# Patient Record
Sex: MALE | Race: WHITE | Employment: FULL TIME | URBAN - METROPOLITAN AREA
[De-identification: names, ages, dates, MRNs, and addresses within clinical notes are randomized per-mention and may not be internally consistent; named-entity substitution may affect disease eponyms.]

---

## 2021-08-04 ENCOUNTER — APPOINTMENT (OUTPATIENT)
Dept: CT IMAGING | Facility: CLINIC | Age: 41
End: 2021-08-04
Payer: COMMERCIAL

## 2021-08-04 ENCOUNTER — HOSPITAL ENCOUNTER (EMERGENCY)
Facility: CLINIC | Age: 41
Discharge: HOME | End: 2021-08-04
Admitting: EMERGENCY MEDICINE
Payer: COMMERCIAL

## 2021-08-04 VITALS
HEART RATE: 72 BPM | DIASTOLIC BLOOD PRESSURE: 80 MMHG | RESPIRATION RATE: 18 BRPM | SYSTOLIC BLOOD PRESSURE: 174 MMHG | TEMPERATURE: 98.8 F | WEIGHT: 315 LBS | HEIGHT: 71 IN | OXYGEN SATURATION: 99 % | BODY MASS INDEX: 44.1 KG/M2

## 2021-08-04 DIAGNOSIS — K42.9 PERIUMBILICAL HERNIA: ICD-10-CM

## 2021-08-04 DIAGNOSIS — R03.0 ELEVATED BP WITHOUT DIAGNOSIS OF HYPERTENSION: ICD-10-CM

## 2021-08-04 LAB
ALBUMIN SERPL-MCNC: 4 G/DL (ref 3.5–5)
ALBUMIN UR-MCNC: NEGATIVE MG/DL
ALP SERPL-CCNC: 157 U/L (ref 45–120)
ALT SERPL W P-5'-P-CCNC: 35 U/L (ref 0–45)
ANION GAP SERPL CALCULATED.3IONS-SCNC: 10 MMOL/L (ref 5–18)
APPEARANCE UR: CLEAR
AST SERPL W P-5'-P-CCNC: 23 U/L (ref 0–40)
BACTERIA #/AREA URNS HPF: NORMAL /HPF
BILIRUB SERPL-MCNC: 0.7 MG/DL (ref 0–1)
BILIRUB UR QL STRIP: NEGATIVE
BUN SERPL-MCNC: 10 MG/DL (ref 8–22)
CALCIUM SERPL-MCNC: 9.7 MG/DL (ref 8.5–10.5)
CHLORIDE BLD-SCNC: 102 MMOL/L (ref 98–107)
CO2 SERPL-SCNC: 26 MMOL/L (ref 22–31)
COLOR UR AUTO: NORMAL
CREAT SERPL-MCNC: 0.86 MG/DL (ref 0.7–1.3)
ERYTHROCYTE [DISTWIDTH] IN BLOOD BY AUTOMATED COUNT: 12.8 % (ref 10–15)
GFR SERPL CREATININE-BSD FRML MDRD: >90 ML/MIN/1.73M2
GLUCOSE BLD-MCNC: 118 MG/DL (ref 70–125)
GLUCOSE UR STRIP-MCNC: NEGATIVE MG/DL
HCT VFR BLD AUTO: 48.9 % (ref 40–53)
HGB BLD-MCNC: 16.6 G/DL (ref 13.3–17.7)
HGB UR QL STRIP: NEGATIVE
KETONES UR STRIP-MCNC: NEGATIVE MG/DL
LEUKOCYTE ESTERASE UR QL STRIP: NEGATIVE
MCH RBC QN AUTO: 30.3 PG (ref 26.5–33)
MCHC RBC AUTO-ENTMCNC: 33.9 G/DL (ref 31.5–36.5)
MCV RBC AUTO: 89 FL (ref 78–100)
NITRATE UR QL: NEGATIVE
PH UR STRIP: 7 [PH] (ref 5–7)
PLATELET # BLD AUTO: 292 10E3/UL (ref 150–450)
POTASSIUM BLD-SCNC: 4.3 MMOL/L (ref 3.5–5)
PROT SERPL-MCNC: 8.5 G/DL (ref 6–8)
RBC # BLD AUTO: 5.48 10E6/UL (ref 4.4–5.9)
RBC URINE: 1 /HPF
SODIUM SERPL-SCNC: 138 MMOL/L (ref 136–145)
SP GR UR STRIP: 1.01 (ref 1–1.03)
UROBILINOGEN UR STRIP-MCNC: <2 MG/DL
WBC # BLD AUTO: 13.6 10E3/UL (ref 4–11)
WBC URINE: 1 /HPF

## 2021-08-04 PROCEDURE — 99285 EMERGENCY DEPT VISIT HI MDM: CPT | Mod: 25

## 2021-08-04 PROCEDURE — 74177 CT ABD & PELVIS W/CONTRAST: CPT

## 2021-08-04 PROCEDURE — 81001 URINALYSIS AUTO W/SCOPE: CPT | Performed by: EMERGENCY MEDICINE

## 2021-08-04 PROCEDURE — 80053 COMPREHEN METABOLIC PANEL: CPT | Performed by: EMERGENCY MEDICINE

## 2021-08-04 PROCEDURE — 250N000011 HC RX IP 250 OP 636: Performed by: EMERGENCY MEDICINE

## 2021-08-04 PROCEDURE — 36415 COLL VENOUS BLD VENIPUNCTURE: CPT | Performed by: EMERGENCY MEDICINE

## 2021-08-04 PROCEDURE — 85018 HEMOGLOBIN: CPT | Performed by: EMERGENCY MEDICINE

## 2021-08-04 RX ORDER — IOPAMIDOL 755 MG/ML
100 INJECTION, SOLUTION INTRAVASCULAR ONCE
Status: COMPLETED | OUTPATIENT
Start: 2021-08-04 | End: 2021-08-04

## 2021-08-04 RX ADMIN — IOPAMIDOL 100 ML: 755 INJECTION, SOLUTION INTRAVENOUS at 16:26

## 2021-08-04 ASSESSMENT — MIFFLIN-ST. JEOR: SCORE: 2345.96

## 2021-08-04 NOTE — ED PROVIDER NOTES
"ED SIGNOUT  Date/Time:8/4/2021 4:11 PM    Patient signed out to me by my colleague, FRANK Andrews.  Please see their note for complete history and physical. Plan to follow up on CT results    Repeat physical exam does reveal a soft periumbilical mass that is minimally tender.    The creation of this record is based on the scribe s observations of the work being performed by Harsh Aragon CNP and the provider s statements to them. It was created on their behalf by Burak Alonso a trained medical scribe. This document has been checked and approved by the attending provider.      REMAINING ED WORKUP:    Vitals:  BP (!) 185/82   Pulse 85   Temp 98.8  F (37.1  C) (Oral)   Resp 16   Ht 1.803 m (5' 11\")   Wt 142.9 kg (315 lb)   SpO2 98%   BMI 43.93 kg/m        Pertinent labs results reviewed   Results for orders placed or performed during the hospital encounter of 08/04/21   CT Abdomen Pelvis w Contrast    Impression    IMPRESSION:   1.  Large fat-containing paraumbilical hernia and small fat-containing hernia left lateral abdominal wall.  2.  Colonic diverticulosis.   UA with Microscopic reflex to Culture    Specimen: Urine, Clean Catch   Result Value Ref Range    Color Urine Light Yellow Colorless, Straw, Light Yellow, Yellow    Appearance Urine Clear Clear    Glucose Urine Negative Negative mg/dL    Bilirubin Urine Negative Negative    Ketones Urine Negative Negative mg/dL    Specific Gravity Urine 1.015 1.001 - 1.030    Blood Urine Negative Negative    pH Urine 7.0 5.0 - 7.0    Protein Albumin Urine Negative Negative mg/dL    Urobilinogen Urine <2.0 <2.0 mg/dL    Nitrite Urine Negative Negative    Leukocyte Esterase Urine Negative Negative    Bacteria Urine None Seen None Seen /HPF    RBC Urine 1 <=2 /HPF    WBC Urine 1 <=5 /HPF   CBC (+ platelets, no diff)   Result Value Ref Range    WBC Count 13.6 (H) 4.0 - 11.0 10e3/uL    RBC Count 5.48 4.40 - 5.90 10e6/uL    Hemoglobin 16.6 13.3 - 17.7 g/dL    " Hematocrit 48.9 40.0 - 53.0 %    MCV 89 78 - 100 fL    MCH 30.3 26.5 - 33.0 pg    MCHC 33.9 31.5 - 36.5 g/dL    RDW 12.8 10.0 - 15.0 %    Platelet Count 292 150 - 450 10e3/uL   Comprehensive metabolic panel   Result Value Ref Range    Sodium 138 136 - 145 mmol/L    Potassium 4.3 3.5 - 5.0 mmol/L    Chloride 102 98 - 107 mmol/L    Carbon Dioxide (CO2) 26 22 - 31 mmol/L    Anion Gap 10 5 - 18 mmol/L    Urea Nitrogen 10 8 - 22 mg/dL    Creatinine 0.86 0.70 - 1.30 mg/dL    Calcium 9.7 8.5 - 10.5 mg/dL    Glucose 118 70 - 125 mg/dL    Alkaline Phosphatase 157 (H) 45 - 120 U/L    AST 23 0 - 40 U/L    ALT 35 0 - 45 U/L    Protein Total 8.5 (H) 6.0 - 8.0 g/dL    Albumin 4.0 3.5 - 5.0 g/dL    Bilirubin Total 0.7 0.0 - 1.0 mg/dL    GFR Estimate >90 >60 mL/min/1.73m2       Pertinent imaging reviewed   Please see official radiology report.  CT Abdomen Pelvis w Contrast   Final Result   IMPRESSION:    1.  Large fat-containing paraumbilical hernia and small fat-containing hernia left lateral abdominal wall.   2.  Colonic diverticulosis.           Interventions  Medications   iopamidol (ISOVUE-370) solution 100 mL (100 mLs Intravenous Given 8/4/21 1626)        ED Course/MDM:  4:12 PM Signout accepted from PAC Darryl Schwarz.  Prior records were reviewed.  Diagnostics from this visit are reviewed.  4:52 PM I introduced myself to the patient. I updated the patient on their results. I discussed the plan for discharge with the patient, and patient is agreeable. We discussed supportive cares at home and reasons for return to the ER including new or worsening symptoms - all questions and concerns addressed. Patient to be discharged by RN.              1. Periumbilical hernia    2. Elevated BP without diagnosis of hypertension          Burak GARCIA, am serving as a scribe to document services personally performed by Harsh Aragon CNP, based on myobservation and the provider's statements to me. IHarsh CNP attest that  Burak Alonso is acting in a scribe capacity, has observed my performance of the services and has documented them in accordance with my direction.    [facility] Emergency Department          Harsh Aragon, APRN CNP  08/04/21 7358

## 2021-08-04 NOTE — ED TRIAGE NOTES
"The patient presents to the ED with c/o mid-abdominal pain that became \"hard\" and painful last night. Reports he has been seen for this before but was not having any pain. Last BM was today.  "

## 2021-08-04 NOTE — DISCHARGE INSTRUCTIONS
Your CT scan does show a hernia containing internal fat. It does not contain any bowel.    Call your surgery clinic to discuss follow up and possible repair.    Avoid heaving lifting and constipation.    Follow up in clinic in 1-2 weeks for recheck of your blood pressure    Return to the ER for new / worsening symptoms or other concerns.

## 2021-08-04 NOTE — ED PROVIDER NOTES
EMERGENCY DEPARTMENT ENCOUNTER      NAME: Kam Lyons  AGE: 41 year old male  YOB: 1980  MRN: [id number]  EVALUATION DATE & TIME: 8/4/2021  2:28 PM    PCP: No primary care provider on file.    ED PROVIDER: Karishma Andrews PA-C      Chief Complaint   Patient presents with     Abdominal Pain     hernia         FINAL IMPRESSION:  1. Periumbilical hernia          ED COURSE & MEDICAL DECISION MAKING:    Pertinent Labs & Imaging studies reviewed. (See chart for details)    41 year old male presents to the Emergency Department for evaluation of jadiel pain.    Physical exam is remarkable for a generally well-appearing male who is in no acute distress.  He does have a palpable umbilical hernia that is soft.  He endorses some tenderness to palpation.  No warmth or erythema noted.  Heart and lung sounds clear diffusely throughout.  Vital signs are stable and he is afebrile.    CBC is remarkable for leukocytosis with white blood cell count of 13.6, no anemia.  CMP is unremarkable with no significant electrolyte derangements, normal liver and kidney function.  Urinalysis without blood or infection.    Patient's care was signed out to Hossein Aragon CNP for follow-up on CT scan.  If the hernia is not strangulated or incarcerated, patient likely okay for discharge home.    ED Course   2:34 PM Performed my initial history and physical exam. Discussed workup in the emergency department, management of symptoms, and likely disposition. PPE: surgical mask.  4:14 PM Care signed out to Hossein Aragon CNP for follow up on CT.     At the conclusion of the encounter I discussed the results of all of the tests and the disposition. The questions were answered. The patient or family acknowledged understanding and was agreeable with the care plan.     Voice recognition software was used in the creation of this note. Any grammatical or nonsensical errors are due to inherent errors with the software and are not the intention of the  writer.     MEDICATIONS GIVEN IN THE EMERGENCY:  Medications   iopamidol (ISOVUE-370) solution 100 mL (has no administration in time range)       NEW PRESCRIPTIONS STARTED AT TODAY'S ER VISIT  New Prescriptions    No medications on file            =================================================================    HPI    Patient information was obtained from: Patient    Use of Intrepreter: N/A      Kam Lyons is a 41 year old male who history of umbilical hernia, HLD, prediabetes, morbid obesity, chronic GERD, presents with abdominal pain.    Patient reports he has a known umbilical hernia which became hard last night while getting ready for bed, noting associated pain to the area. No recent heavy lifting or exercise. He did not have pain with the hernia before last night. No nausea, vomiting, new diarrhea, black or bloody stools, fevers, dysuria, hematuria. He has been taking advil for pain. No prior abdominal surgeries.    Per chart review, patient was evaluated by Dr. Jose G Maradiaga with [facility] on 7/16/21 for umbilical hernia. Plan to proceed with observation and to schedule surgery once patient has reached weight loss goals.    REVIEW OF SYSTEMS   Constitutional:  No reported fever  GI:  No reported nausea, vomiting, dark or bloody stools, or new diarrhea. Positive for abdominal pain, hernia  : No reported dysuria, hematuria, difficulty urinating    All other systems reviewed and are negative unless noted in HPI.    PAST MEDICAL HISTORY:  Past Medical History:   Diagnosis Date     NO ACTIVE PROBLEMS        PAST SURGICAL HISTORY:  Past Surgical History:   Procedure Laterality Date     ARTHROSCOPIC RECONSTRUCTION ANTERIOR CRUCIATE LIGAMENT  11/2011    L knee ACL       CURRENT MEDICATIONS:    loratadine (CLARITIN) 10 MG tablet  NO ACTIVE MEDICATIONS        ALLERGIES:  No Known Allergies    FAMILY HISTORY:  Family History   Problem Relation Age of Onset     Diabetes Mother          "prediabetic       SOCIAL HISTORY:   Social History     Socioeconomic History     Marital status: Single     Spouse name: Not on file     Number of children: Not on file     Years of education: Not on file     Highest education level: Not on file   Occupational History     Not on file   Tobacco Use     Smoking status: Never Smoker     Smokeless tobacco: Never Used   Substance and Sexual Activity     Alcohol use: Yes     Alcohol/week: 4.2 standard drinks     Types: 5 drink(s) per week     Drug use: No     Sexual activity: Not on file   Other Topics Concern     Parent/sibling w/ CABG, MI or angioplasty before 65F 55M? Not Asked   Social History Narrative     Not on file     Social Determinants of Health     Financial Resource Strain:      Difficulty of Paying Living Expenses:    Food Insecurity:      Worried About Running Out of Food in the Last Year:      Ran Out of Food in the Last Year:    Transportation Needs:      Lack of Transportation (Medical):      Lack of Transportation (Non-Medical):    Physical Activity:      Days of Exercise per Week:      Minutes of Exercise per Session:    Stress:      Feeling of Stress :    Social Connections:      Frequency of Communication with Friends and Family:      Frequency of Social Gatherings with Friends and Family:      Attends Orthodoxy Services:      Active Member of Clubs or Organizations:      Attends Club or Organization Meetings:      Marital Status:    Intimate Partner Violence:      Fear of Current or Ex-Partner:      Emotionally Abused:      Physically Abused:      Sexually Abused:        VITALS:  Patient Vitals for the past 24 hrs:   BP Temp Temp src Pulse Resp SpO2 Height Weight   08/04/21 1226 (!) 185/82 98.8  F (37.1  C) Oral 85 16 98 % 1.803 m (5' 11\") 142.9 kg (315 lb)       PHYSICAL EXAM    VITAL SIGNS: BP (!) 185/82   Pulse 85   Temp 98.8  F (37.1  C) (Oral)   Resp 16   Ht 1.803 m (5' 11\")   Wt 142.9 kg (315 lb)   SpO2 98%   BMI 43.93 kg/m    General " Appearance: Alert, cooperative, normal speech and facial symmetry, appears stated age, the patient does not appear in distress  Head:  Normocephalic, without obvious abnormality, atraumatic  Cardio:  Regular rate and rhythm, S1 and S2 normal, no murmur, rub    or gallop, 2+ pulses symmetric in all extremities  Pulm:  Clear to auscultation bilaterally, respirations unlabored with no accessory muscle use  Abdomen: Palpable umbilical hernia that is soft.  He endorses some tenderness to palpation.  No warmth or erythema noted.   Neuro: Patient is awake, alert, and responsive to voice. No gross motor weaknesses or sensory loss; moves all extremities.     LAB:  All pertinent labs reviewed and interpreted.  Labs Ordered and Resulted from Time of ED Arrival Up to the Time of Departure from the ED   CBC WITH PLATELETS - Abnormal; Notable for the following components:       Result Value    WBC Count 13.6 (*)     All other components within normal limits   COMPREHENSIVE METABOLIC PANEL - Abnormal; Notable for the following components:    Alkaline Phosphatase 157 (*)     Protein Total 8.5 (*)     All other components within normal limits   ROUTINE UA WITH MICROSCOPIC REFLEX TO CULTURE - Normal    Narrative:     Urine Culture not indicated   PERIPHERAL IV CATHETER       RADIOLOGY:  Reviewed all pertinent imaging. Please see official radiology report.  CT Abdomen Pelvis w Contrast    (Results Pending)           I, Pilar Castillo, am serving as a scribe to document services personally performed by Karishma Andrews PA-C based on my observation and the provider's statements to me. IKarishma PA-C attest that Pilar Castillo is acting in a scribe capacity, has observed my performance of the services and has documented them in accordance with my direction.     Karishma Andrews PA-C  Emergency Medicine  [facility] EMERGENCY ROOM  [address]  [id number]  [phone]  Dept: [phone]     Karishma Andrews PA-C  08/04/21 0978

## 2021-09-25 ENCOUNTER — HEALTH MAINTENANCE LETTER (OUTPATIENT)
Age: 41
End: 2021-09-25

## 2022-12-26 ENCOUNTER — HEALTH MAINTENANCE LETTER (OUTPATIENT)
Age: 42
End: 2022-12-26

## 2024-02-04 ENCOUNTER — HEALTH MAINTENANCE LETTER (OUTPATIENT)
Age: 44
End: 2024-02-04